# Patient Record
Sex: FEMALE | Race: BLACK OR AFRICAN AMERICAN | Employment: FULL TIME
[De-identification: names, ages, dates, MRNs, and addresses within clinical notes are randomized per-mention and may not be internally consistent; named-entity substitution may affect disease eponyms.]

---

## 2024-01-02 ENCOUNTER — HOSPITAL ENCOUNTER (EMERGENCY)
Facility: HOSPITAL | Age: 29
Discharge: HOME | End: 2024-01-02

## 2024-01-02 VITALS
HEIGHT: 67 IN | WEIGHT: 150 LBS | TEMPERATURE: 98.6 F | SYSTOLIC BLOOD PRESSURE: 142 MMHG | DIASTOLIC BLOOD PRESSURE: 90 MMHG | HEART RATE: 95 BPM | OXYGEN SATURATION: 100 % | BODY MASS INDEX: 23.54 KG/M2 | RESPIRATION RATE: 18 BRPM

## 2024-01-02 DIAGNOSIS — R42 LIGHT HEADED: ICD-10-CM

## 2024-01-02 DIAGNOSIS — J06.9 ACUTE UPPER RESPIRATORY INFECTION: Primary | ICD-10-CM

## 2024-01-02 DIAGNOSIS — R03.0 ELEVATED BLOOD PRESSURE READING: ICD-10-CM

## 2024-01-02 LAB
EKG ATRIAL RATE: 97 BPM
EKG DIAGNOSIS: NORMAL
EKG P AXIS: 68 DEGREES
EKG P-R INTERVAL: 158 MS
EKG Q-T INTERVAL: 356 MS
EKG QRS DURATION: 82 MS
EKG QTC CALCULATION (BAZETT): 452 MS
EKG R AXIS: 85 DEGREES
EKG T AXIS: 58 DEGREES
EKG VENTRICULAR RATE: 97 BPM
FLUAV AG NPH QL IA: NEGATIVE
FLUBV AG NOSE QL IA: NEGATIVE
S PYO AG THROAT QL: NEGATIVE
SARS-COV-2 RDRP RESP QL NAA+PROBE: NOT DETECTED
SOURCE: NORMAL

## 2024-01-02 PROCEDURE — 93005 ELECTROCARDIOGRAM TRACING: CPT | Performed by: EMERGENCY MEDICINE

## 2024-01-02 PROCEDURE — 87070 CULTURE OTHR SPECIMN AEROBIC: CPT

## 2024-01-02 PROCEDURE — 87880 STREP A ASSAY W/OPTIC: CPT

## 2024-01-02 PROCEDURE — 87635 SARS-COV-2 COVID-19 AMP PRB: CPT

## 2024-01-02 PROCEDURE — 99284 EMERGENCY DEPT VISIT MOD MDM: CPT

## 2024-01-02 PROCEDURE — 87804 INFLUENZA ASSAY W/OPTIC: CPT

## 2024-01-02 ASSESSMENT — PAIN - FUNCTIONAL ASSESSMENT: PAIN_FUNCTIONAL_ASSESSMENT: NONE - DENIES PAIN

## 2024-01-02 NOTE — ED PROVIDER NOTES
ED.  Interpretation per the Radiologist below, if available at the time of this note:  No orders to display       Medications given in the ED-  Medications - No data to display    Please note that this dictation was completed with [employer], the computer voice recognition software.  Quite often unanticipated grammatical, syntax, homophones, and other interpretive errors are inadvertently transcribed by the computer software.  Please disregard these errors.  Please excuse any errors that have escaped final proofreading.       Neva Hoang PA-C  01/02/24 [id number]

## 2024-01-02 NOTE — ED TRIAGE NOTES
Pt ambulatory to triage c/o congestion, elevated bp, and feeling lightheaded while at work today. Took nyquil last night for congestion. 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

## 2024-01-02 NOTE — DISCHARGE INSTRUCTIONS
Your rapid flu and COVID test are negative today  Rapid strep is negative, throat culture was sent.  If throat culture positive we will call to add antibiotics  Your symptoms are suggestive of a viral illness.  It is still possible you could have COVID and/or flu, just early to test.  Consider retesting if concerned  It is important that you have healthy diet  Continue fluids with salt and sugars  Return to ER if any new or worsening symptoms    Your blood pressure was consistently elevated while here in the ED.  Is importantly follow-up with primary care this week for further evaluation and treatment

## 2024-01-02 NOTE — ED NOTES
Paperwork read with patient making note of where to  prescriptions.    Armband removed and disposed of in shredder.     Patient has no further questions at this time.     Will discharge from system.

## 2024-01-04 LAB
BACTERIA SPEC CULT: NORMAL
SERVICE CMNT-IMP: NORMAL